# Patient Record
Sex: MALE | Race: WHITE | NOT HISPANIC OR LATINO | Employment: OTHER | URBAN - METROPOLITAN AREA
[De-identification: names, ages, dates, MRNs, and addresses within clinical notes are randomized per-mention and may not be internally consistent; named-entity substitution may affect disease eponyms.]

---

## 2018-01-26 NOTE — PATIENT DISCUSSION
Patient educated with the Basic option, they will most likely need prescription glasses at all focal points after surgery. Patient elects Basic OS, goal of emmetropia.

## 2020-02-05 ENCOUNTER — NEW PATIENT COMPREHENSIVE (OUTPATIENT)
Dept: URBAN - METROPOLITAN AREA CLINIC 42 | Facility: CLINIC | Age: 65
End: 2020-02-05

## 2020-02-05 VITALS — HEIGHT: 60 IN | DIASTOLIC BLOOD PRESSURE: 75 MMHG | SYSTOLIC BLOOD PRESSURE: 125 MMHG

## 2020-02-05 DIAGNOSIS — H02.885: ICD-10-CM

## 2020-02-05 DIAGNOSIS — H52.4: ICD-10-CM

## 2020-02-05 DIAGNOSIS — H52.03: ICD-10-CM

## 2020-02-05 DIAGNOSIS — H02.831: ICD-10-CM

## 2020-02-05 DIAGNOSIS — H11.022: ICD-10-CM

## 2020-02-05 DIAGNOSIS — H52.203: ICD-10-CM

## 2020-02-05 DIAGNOSIS — H02.834: ICD-10-CM

## 2020-02-05 DIAGNOSIS — H25.13: ICD-10-CM

## 2020-02-05 PROCEDURE — 92015 DETERMINE REFRACTIVE STATE: CPT

## 2020-02-05 PROCEDURE — 92004 COMPRE OPH EXAM NEW PT 1/>: CPT

## 2020-02-05 ASSESSMENT — TONOMETRY
OD_IOP_MMHG: 17
OS_IOP_MMHG: 13

## 2020-02-05 ASSESSMENT — KERATOMETRY
OS_AXISANGLE2_DEGREES: 160
OS_K1POWER_DIOPTERS: 43.5
OD_K1POWER_DIOPTERS: 43.75
OD_K2POWER_DIOPTERS: 44.25
OD_AXISANGLE2_DEGREES: 162
OD_AXISANGLE_DEGREES: 72
OS_AXISANGLE_DEGREES: 70
OS_K2POWER_DIOPTERS: 44.5

## 2020-02-05 ASSESSMENT — VISUAL ACUITY
OU_SC: 20/25-1
OS_SC: 20/50
OD_SC: 20/30
OU_SC: 20/30
OD_SC: 20/200
OS_SC: 20/50-1

## 2020-10-14 NOTE — PATIENT DISCUSSION
Retinal tear and detachment warning symptoms reviewed and patient instructed to call immediately if increasing floaters, flashes, or decreasing peripheral vision. Pt blood glucose was 64 and repeated was 70

## 2021-05-24 ENCOUNTER — ESTABLISHED COMPREHENSIVE EXAM (OUTPATIENT)
Dept: URBAN - METROPOLITAN AREA CLINIC 42 | Facility: CLINIC | Age: 66
End: 2021-05-24

## 2021-05-24 DIAGNOSIS — H11.022: ICD-10-CM

## 2021-05-24 DIAGNOSIS — H52.203: ICD-10-CM

## 2021-05-24 DIAGNOSIS — H25.13: ICD-10-CM

## 2021-05-24 DIAGNOSIS — H52.03: ICD-10-CM

## 2021-05-24 DIAGNOSIS — H52.4: ICD-10-CM

## 2021-05-24 DIAGNOSIS — H02.885: ICD-10-CM

## 2021-05-24 PROCEDURE — 92014 COMPRE OPH EXAM EST PT 1/>: CPT

## 2021-05-24 PROCEDURE — 92015 DETERMINE REFRACTIVE STATE: CPT

## 2021-05-24 ASSESSMENT — KERATOMETRY
OS_K1POWER_DIOPTERS: 43.5
OS_AXISANGLE_DEGREES: 56
OD_K1POWER_DIOPTERS: 43.50
OD_K2POWER_DIOPTERS: 44.25
OD_AXISANGLE_DEGREES: 72
OS_AXISANGLE_DEGREES: 70
OS_K1POWER_DIOPTERS: 43.25
OS_K2POWER_DIOPTERS: 44.5
OD_AXISANGLE_DEGREES: 93
OD_AXISANGLE2_DEGREES: 3
OS_AXISANGLE2_DEGREES: 146
OS_AXISANGLE2_DEGREES: 160
OS_K2POWER_DIOPTERS: 44.00
OD_AXISANGLE2_DEGREES: 162
OD_K1POWER_DIOPTERS: 43.75

## 2021-05-24 ASSESSMENT — VISUAL ACUITY
OU_CC: 20/20
OD_CC: 20/20-1
OU_SC: 20/30
OS_CC: 20/20
OS_SC: 20/40-2
OD_SC: 20/30-2
OS_CC: 20/20
OD_CC: 20/20
OU_CC: 20/20
OS_SC: 20/40-1
OD_SC: 20/70
OU_SC: 20/30-1

## 2021-05-24 ASSESSMENT — TONOMETRY
OS_IOP_MMHG: 14
OD_IOP_MMHG: 13

## 2022-06-20 ENCOUNTER — COMPREHENSIVE EXAM (OUTPATIENT)
Dept: URBAN - METROPOLITAN AREA CLINIC 42 | Facility: CLINIC | Age: 67
End: 2022-06-20

## 2022-06-20 DIAGNOSIS — H11.042: ICD-10-CM

## 2022-06-20 DIAGNOSIS — H52.4: ICD-10-CM

## 2022-06-20 DIAGNOSIS — H25.13: ICD-10-CM

## 2022-06-20 DIAGNOSIS — H52.223: ICD-10-CM

## 2022-06-20 DIAGNOSIS — H02.885: ICD-10-CM

## 2022-06-20 PROCEDURE — 92014 COMPRE OPH EXAM EST PT 1/>: CPT

## 2022-06-20 PROCEDURE — 92015 DETERMINE REFRACTIVE STATE: CPT

## 2022-06-20 ASSESSMENT — KERATOMETRY
OD_AXISANGLE_DEGREES: 72
OD_K1POWER_DIOPTERS: 43.50
OD_AXISANGLE2_DEGREES: 3
OS_K2POWER_DIOPTERS: 44.5
OS_K1POWER_DIOPTERS: 44.00
OD_AXISANGLE_DEGREES: 93
OD_K2POWER_DIOPTERS: 44.25
OD_K1POWER_DIOPTERS: 43.75
OS_K2POWER_DIOPTERS: 44.50
OS_AXISANGLE2_DEGREES: 146
OD_K2POWER_DIOPTERS: 44.50
OS_K1POWER_DIOPTERS: 43.25
OS_AXISANGLE_DEGREES: 70
OS_K1POWER_DIOPTERS: 43.5
OD_AXISANGLE2_DEGREES: 162
OS_AXISANGLE2_DEGREES: 160
OS_AXISANGLE2_DEGREES: 161
OS_K2POWER_DIOPTERS: 44.00
OS_AXISANGLE_DEGREES: 71
OS_AXISANGLE_DEGREES: 56

## 2022-06-20 ASSESSMENT — TONOMETRY
OS_IOP_MMHG: 14
OD_IOP_MMHG: 13

## 2022-06-20 ASSESSMENT — VISUAL ACUITY
OD_SC: 20/20-1
OU_SC: 20/25-2
OU_SC: 20/20-2
OD_SC: 20/50
OS_SC: 20/30-1
OS_SC: 20/60

## 2023-06-20 ENCOUNTER — COMPREHENSIVE EXAM (OUTPATIENT)
Dept: URBAN - METROPOLITAN AREA CLINIC 42 | Facility: CLINIC | Age: 68
End: 2023-06-20

## 2023-06-20 DIAGNOSIS — H52.223: ICD-10-CM

## 2023-06-20 DIAGNOSIS — H52.4: ICD-10-CM

## 2023-06-20 DIAGNOSIS — H02.885: ICD-10-CM

## 2023-06-20 DIAGNOSIS — H25.13: ICD-10-CM

## 2023-06-20 DIAGNOSIS — H11.042: ICD-10-CM

## 2023-06-20 PROCEDURE — 92015 DETERMINE REFRACTIVE STATE: CPT

## 2023-06-20 PROCEDURE — 92014 COMPRE OPH EXAM EST PT 1/>: CPT

## 2023-06-20 ASSESSMENT — KERATOMETRY
OS_AXISANGLE2_DEGREES: 160
OS_K1POWER_DIOPTERS: 43.25
OS_K2POWER_DIOPTERS: 44.50
OD_K1POWER_DIOPTERS: 43.50
OD_AXISANGLE_DEGREES: 173
OS_AXISANGLE2_DEGREES: 161
OD_AXISANGLE_DEGREES: 93
OD_AXISANGLE_DEGREES: 72
OS_AXISANGLE2_DEGREES: 61
OD_AXISANGLE2_DEGREES: 162
OD_K2POWER_DIOPTERS: 44.25
OD_AXISANGLE2_DEGREES: 3
OS_K2POWER_DIOPTERS: 44.00
OS_K2POWER_DIOPTERS: 43.75
OD_K2POWER_DIOPTERS: 43.50
OS_AXISANGLE2_DEGREES: 146
OS_AXISANGLE_DEGREES: 56
OD_K1POWER_DIOPTERS: 43.75
OD_AXISANGLE2_DEGREES: 83
OS_AXISANGLE_DEGREES: 151
OS_K1POWER_DIOPTERS: 43.5
OD_K1POWER_DIOPTERS: 44.50
OD_K2POWER_DIOPTERS: 44.50
OS_AXISANGLE_DEGREES: 70
OS_K1POWER_DIOPTERS: 44.00
OS_K2POWER_DIOPTERS: 44.5
OS_AXISANGLE_DEGREES: 71

## 2023-06-20 ASSESSMENT — TONOMETRY
OS_IOP_MMHG: 11
OD_IOP_MMHG: 16

## 2024-06-25 ENCOUNTER — COMPREHENSIVE EXAM (OUTPATIENT)
Dept: URBAN - METROPOLITAN AREA CLINIC 42 | Facility: CLINIC | Age: 69
End: 2024-06-25

## 2024-06-25 DIAGNOSIS — H11.042: ICD-10-CM

## 2024-06-25 DIAGNOSIS — H52.223: ICD-10-CM

## 2024-06-25 DIAGNOSIS — H04.123: ICD-10-CM

## 2024-06-25 DIAGNOSIS — H02.831: ICD-10-CM

## 2024-06-25 DIAGNOSIS — H52.02: ICD-10-CM

## 2024-06-25 DIAGNOSIS — H25.13: ICD-10-CM

## 2024-06-25 DIAGNOSIS — H02.834: ICD-10-CM

## 2024-06-25 DIAGNOSIS — H02.885: ICD-10-CM

## 2024-06-25 DIAGNOSIS — H52.4: ICD-10-CM

## 2024-06-25 PROCEDURE — 92015 DETERMINE REFRACTIVE STATE: CPT

## 2024-06-25 PROCEDURE — 92014 COMPRE OPH EXAM EST PT 1/>: CPT

## 2024-06-25 ASSESSMENT — KERATOMETRY
OS_AXISANGLE_DEGREES: 71
OD_K2POWER_DIOPTERS: 44.25
OS_K1POWER_DIOPTERS: 43.25
OS_AXISANGLE2_DEGREES: 61
OD_AXISANGLE2_DEGREES: 78
OD_AXISANGLE2_DEGREES: 83
OS_AXISANGLE2_DEGREES: 41
OS_K2POWER_DIOPTERS: 43.75
OD_AXISANGLE_DEGREES: 93
OD_AXISANGLE2_DEGREES: 162
OS_K1POWER_DIOPTERS: 44.00
OS_K1POWER_DIOPTERS: 43.5
OS_K2POWER_DIOPTERS: 44.50
OS_AXISANGLE2_DEGREES: 161
OS_K2POWER_DIOPTERS: 44.5
OS_K2POWER_DIOPTERS: 44.00
OD_K1POWER_DIOPTERS: 43.50
OD_AXISANGLE_DEGREES: 173
OS_K1POWER_DIOPTERS: 44.75
OD_K2POWER_DIOPTERS: 43.50
OD_AXISANGLE_DEGREES: 168
OD_AXISANGLE_DEGREES: 72
OS_AXISANGLE2_DEGREES: 146
OS_AXISANGLE_DEGREES: 131
OD_AXISANGLE2_DEGREES: 3
OD_K2POWER_DIOPTERS: 43.25
OS_AXISANGLE_DEGREES: 151
OS_AXISANGLE_DEGREES: 70
OD_K1POWER_DIOPTERS: 43.75
OS_AXISANGLE2_DEGREES: 160
OD_K2POWER_DIOPTERS: 44.50
OS_AXISANGLE_DEGREES: 56
OD_K1POWER_DIOPTERS: 44.50

## 2024-06-25 ASSESSMENT — TONOMETRY
OD_IOP_MMHG: 13
OS_IOP_MMHG: 15